# Patient Record
Sex: MALE | Race: ASIAN | NOT HISPANIC OR LATINO | URBAN - METROPOLITAN AREA
[De-identification: names, ages, dates, MRNs, and addresses within clinical notes are randomized per-mention and may not be internally consistent; named-entity substitution may affect disease eponyms.]

---

## 2019-12-24 ENCOUNTER — EMERGENCY (EMERGENCY)
Facility: HOSPITAL | Age: 34
LOS: 1 days | Discharge: HOME | End: 2019-12-24
Attending: EMERGENCY MEDICINE
Payer: COMMERCIAL

## 2019-12-24 VITALS
OXYGEN SATURATION: 96 % | TEMPERATURE: 101 F | DIASTOLIC BLOOD PRESSURE: 79 MMHG | WEIGHT: 190.04 LBS | RESPIRATION RATE: 16 BRPM | SYSTOLIC BLOOD PRESSURE: 125 MMHG | HEART RATE: 98 BPM | HEIGHT: 65 IN

## 2019-12-24 VITALS
OXYGEN SATURATION: 98 % | RESPIRATION RATE: 18 BRPM | SYSTOLIC BLOOD PRESSURE: 122 MMHG | TEMPERATURE: 101 F | DIASTOLIC BLOOD PRESSURE: 77 MMHG | HEART RATE: 97 BPM

## 2019-12-24 PROCEDURE — 99283 EMERGENCY DEPT VISIT LOW MDM: CPT

## 2019-12-24 RX ORDER — ACETAMINOPHEN 500 MG
650 TABLET ORAL ONCE
Refills: 0 | Status: DISCONTINUED | OUTPATIENT
Start: 2019-12-24 | End: 2019-12-24

## 2019-12-24 RX ORDER — IBUPROFEN 200 MG
200 TABLET ORAL ONCE
Refills: 0 | Status: COMPLETED | OUTPATIENT
Start: 2019-12-24 | End: 2019-12-24

## 2019-12-24 RX ORDER — ACETAMINOPHEN 500 MG
325 TABLET ORAL ONCE
Refills: 0 | Status: COMPLETED | OUTPATIENT
Start: 2019-12-24 | End: 2019-12-24

## 2019-12-24 RX ADMIN — Medication 325 MILLIGRAM(S): at 22:06

## 2019-12-24 RX ADMIN — Medication 200 MILLIGRAM(S): at 22:22

## 2019-12-24 RX ADMIN — Medication 200 MILLIGRAM(S): at 22:06

## 2019-12-24 RX ADMIN — Medication 325 MILLIGRAM(S): at 22:23

## 2019-12-24 NOTE — ED PROVIDER NOTE - ATTENDING CONTRIBUTION TO CARE
Pt with fever, body aches.  No red flags for sepsis.  Clear lungs, abdomen soft, no rash, no meningeal signs, no respiratory distress.  Symptomatic relief, education, encourage fluid intake.

## 2019-12-24 NOTE — ED ADULT NURSE NOTE - OBJECTIVE STATEMENT
pt had a fever since today.  he last took motrin at 1800,  he denies throat or ear pain but feels generalized weakness and aches

## 2019-12-24 NOTE — ED PROVIDER NOTE - CLINICAL SUMMARY MEDICAL DECISION MAKING FREE TEXT BOX
33 y/o male presenting to the ED with fever and body aches. Likely viral syndrome given presentation. No focal exam findings. Will d/c home for f/u with PMD.

## 2019-12-24 NOTE — ED PROVIDER NOTE - OBJECTIVE STATEMENT
33 y/o M p/w fever since yesterday, tmax 103. Also notes body aches and cough. Denies sore throat. Possible sick contacts. Hasn't taken flu shot this year. Last took Tylenol at 7pm and Motrin at 7:30pm with no relief.

## 2019-12-24 NOTE — ED PROVIDER NOTE - NSFOLLOWUPINSTRUCTIONS_ED_ALL_ED_FT
Use tylenol 650mg and ibuprofen 600mg every 8 hours as needed. Drink plenty of fluids. Follow up with your PMD within the week if symptoms continue.     A viral respiratory infection is an illness that affects parts of the body used for breathing, like the lungs, nose, and throat. It is caused by a germ called a virus. Symptoms can include runny nose, coughing, sneezing, fatigue, body aches, sore throat, fever, or headache. Over the counter medicine can be used to manage the symptoms but the infection typically goes away on its own in 5 to 10 days.     SEEK IMMEDIATE MEDICAL CARE IF YOU HAVE ANY OF THE FOLLOWING SYMPTOMS: shortness of breath, chest pain, fever over 10 days, or lightheadedness/dizziness.

## 2019-12-24 NOTE — ED PROVIDER NOTE - PATIENT PORTAL LINK FT
You can access the FollowMyHealth Patient Portal offered by [facility] by registering at the following website: [email or web address]. By joining [employer]’s FollowMyHealth portal, you will also be able to view your health information using other applications (apps) compatible with our system.

## 2021-10-22 ENCOUNTER — EMERGENCY (EMERGENCY)
Facility: HOSPITAL | Age: 36
LOS: 1 days | Discharge: HOME | End: 2021-10-22
Attending: STUDENT IN AN ORGANIZED HEALTH CARE EDUCATION/TRAINING PROGRAM
Payer: COMMERCIAL

## 2021-10-22 VITALS
OXYGEN SATURATION: 98 % | WEIGHT: 199.96 LBS | RESPIRATION RATE: 15 BRPM | DIASTOLIC BLOOD PRESSURE: 99 MMHG | HEIGHT: 65 IN | HEART RATE: 72 BPM | SYSTOLIC BLOOD PRESSURE: 141 MMHG | TEMPERATURE: 98 F

## 2021-10-22 VITALS
HEART RATE: 78 BPM | OXYGEN SATURATION: 99 % | RESPIRATION RATE: 16 BRPM | SYSTOLIC BLOOD PRESSURE: 128 MMHG | DIASTOLIC BLOOD PRESSURE: 82 MMHG | TEMPERATURE: 98 F

## 2021-10-22 PROCEDURE — 99283 EMERGENCY DEPT VISIT LOW MDM: CPT

## 2021-10-22 PROCEDURE — 99283 EMERGENCY DEPT VISIT LOW MDM: CPT | Mod: 25

## 2021-10-22 PROCEDURE — 73630 X-RAY EXAM OF FOOT: CPT

## 2021-10-22 PROCEDURE — 73630 X-RAY EXAM OF FOOT: CPT | Mod: 26,LT

## 2021-10-22 RX ORDER — ACETAMINOPHEN 500 MG
650 TABLET ORAL ONCE
Refills: 0 | Status: COMPLETED | OUTPATIENT
Start: 2021-10-22 | End: 2021-10-22

## 2021-10-22 RX ORDER — IBUPROFEN 200 MG
400 TABLET ORAL ONCE
Refills: 0 | Status: COMPLETED | OUTPATIENT
Start: 2021-10-22 | End: 2021-10-22

## 2021-10-22 RX ADMIN — Medication 650 MILLIGRAM(S): at 19:57

## 2021-10-22 RX ADMIN — Medication 400 MILLIGRAM(S): at 19:58

## 2021-10-22 NOTE — ED PROVIDER NOTE - PHYSICAL EXAMINATION
gen: well appearing  Mentation: AAO x 3  psych: mood appropriate  HEENT: airway patent, conjunctivae clear bilaterally  Cardio: RRR, no m/r/g  Resp: normal BS b/l  GI: soft/nondistended/nontender  Neuro: sensation and motor function grossly intact in bilateral DENISHA  Skin: No evidence of rash, lacerations, erythema of the left foot  MSK: Swelling in dorsum of left foot

## 2021-10-22 NOTE — ED PROVIDER NOTE - NSFOLLOWUPCLINICS_GEN_ALL_ED_FT
[facility]  Podiatry  [address] [place] [address] - [address] [place] [address]  Phone: [phone]  Fax:   Follow Up Time: 4-6 Days

## 2021-10-22 NOTE — ED PROVIDER NOTE - NSFOLLOWUPINSTRUCTIONS_ED_ALL_ED_FT
We evaluated you in the emergency room and it appears that you have foot pain.     Avoid the motions that cause you the pain. Limit walking and weight on the foot if you are having pain.     We recommend that you rest, ice and take tylenol(650 mg up to three times a day)/motrin(600 mg up to three times a day) for your symptoms.       If you still have persistent pain after 5-7 days after the injury please be re-evaluated as there could be a more severe diagnosis. If you develop numbness, weakness, change in color of your extremities (turn blue or white), worsening pain please seek immediate medical care for repeat evaluation.

## 2021-10-22 NOTE — ED PROVIDER NOTE - PROGRESS NOTE DETAILS
discussion w/ patient regarding the findings of no fracutre ddx for foot pain include, mortons neuroma, metatarsal fracture ligamentous injury or nerve injury  offered to have podiatry see patient in the Er due to pt leaving on trip tomorrow, pt declined, will avoid walking if he has pain,

## 2021-10-22 NOTE — ED PROVIDER NOTE - ATTENDING CONTRIBUTION TO CARE
36 M no pmh  w/ L foot swelling started on tuesday, w/ mild pain, pt states he is having pain on the dorsum, but not the plantar aspect, has mild pain while walking, not radiating, no pain at rest. Pt denies trauma to the area, no cuts, pt is leaving for [place] tomorrow and wants it to be evaluated. no known trauma.   afebrile, mild swelling of the dorsum aspect,   fracture, gout, 36 M no pmh  w/ L foot swelling started on tuesday, w/ mild pain, pt states he is having pain on the dorsum, but not the plantar aspect, has mild pain while walking, not radiating, no pain at rest. Pt denies trauma to the area, no cuts, pt is leaving for [place] tomorrow and wants it to be evaluated. no known trauma.   afebrile, mild swelling of the 4/5 base of the metatarsal, w/ no erythema, no palpable deformity, 2+ DP pulse, intact sensation in the bilateral legs to light touch along the 5 nerves of the foot, pt w/ no calf swelling, no calf pain, knees w/ no edema, intact flexion and extension of the, ankles and knees w/out  difficulty   plan for xray and dc home w/ outpt follow up. broad ddx, mortons neuroma, fx, ligmentous injury.

## 2021-10-22 NOTE — ED PROVIDER NOTE - PATIENT PORTAL LINK FT
You can access the FollowMyHealth Patient Portal offered by [facility] by registering at the following website: [email or web address]. By joining [employer]’s FollowMyHealth portal, you will also be able to view your health information using other applications (apps) compatible with our system.

## 2021-10-22 NOTE — ED ADULT NURSE NOTE - OBJECTIVE STATEMENT
1830 37 y/o male to 3 ambulatory w/c/o l foot pain and swelling x 1d. does not recall any inj. no discoloration noted,

## 2021-10-22 NOTE — ED PROVIDER NOTE - OBJECTIVE STATEMENT
36 year old male with no PMHx presenting with left foot swelling. Began on Tuesday night, continued to get better through Thursday night. Patient woke up today with pain and more swelling in the left foot. Patient has pain on the dorsum of his foot. States that it feels like someone "dropped something heavy on his foot". Denies trauma to the foot. Pain is non-radiating, constant. Less painful now. Patient is going to [place] tomorrow and would like to be evaluated. Denies fevers, chills, CP, SOB, abdominal pain.
